# Patient Record
Sex: MALE | Employment: OTHER | URBAN - METROPOLITAN AREA
[De-identification: names, ages, dates, MRNs, and addresses within clinical notes are randomized per-mention and may not be internally consistent; named-entity substitution may affect disease eponyms.]

---

## 2024-04-15 NOTE — PROGRESS NOTES
Subjective     Estevan Staples is a 63 y.o. male with HG MIBC and T1c GGG2 PCa who presents as a new patient kindly referred by Dr. Varela for 2nd opinion.     The patient underwent TURBT and prostate biopsy 2/2024. Pathology was HG MIBC with iliac adenopathy and GGG2 T1c PCa in 1 core.    PSMA PET scan showed some lymph node activity on the R side.      He denies gross hematuria. He is able to walk a city block without issue.     PMHx:  has no past medical history on file.    PSHx:  Denies history of abdominal surgeries    Social: Tobacco Use: current smoker    Family: Cancer-related family history is not on file.              Review of Systems   All other systems reviewed and are negative.      Objective   Physical Exam  Gen: No acute distress     Psych: Alert and oriented x3     Neuro:  Normal ROM    Resp: Nonlabored respirations     CV: Regular rate and rhythm     Abd: S, NT, ND.   : Deferred    Skin: Warm, dry and intact without rashes     Lymphatics: No peripheral edema       Assessment/Plan   Problem List Items Addressed This Visit             ICD-10-CM    Bladder cancer (Multi) - Primary C67.9     I had a long talk today with the patient.  We spoke at length regarding the new diagnosis of muscle invasive bladder cancer and the treatment approaches.  He also has a new intermediate favorable prostate cancer which is less concerning.    CT urogram from months ago and a recent PET scan scan showed some lymph node activity on the R side. These lymph nodes are large and look necrotic on imaging.  They look very close to the vasculature.  I think these are related to his bladder cancer.  I do not think that a biopsy is completely necessary but not unreasonable.  Given that this lymph nodes are presumably bladder cancer, he is locally advanced.  I would not treat this with upfront surgery.  I think he needs chemotherapy first.  If his lymph nodes respond to chemotherapy then surgical resection for consolidation  can be considered.  I would love to see him back after chemotherapy to assess his scans and discuss.      We went over that treatment options in detail, specifically radical cystectomy, bilateral pelvic lymph node dissection and creation of urinary diversion with our without neoadjuvant chemotherapy.  We spoke about the benefit the 5-10% overall survival benefit when patients get chemotherapy prior to surgery when compared to patients who undergo surgery alone.  I normally recommend chemotherapy is cisplatin-eligible or clinical trial in cisplatin-ineligible patients.  I have set up a coordinated appointment with medical oncology for further information regarding neoadjuvant chemotherapy and possible consideration for clinical trials.      I have also given him information on cystectomy and BCAN. I will review his CT ordered after chemotherapy.            Relevant Orders    Referral to Malignant Hematology (Hematology / Oncology)       Thank you for the kind referral and allowing me to take part in the care of this patient.        Plan:  Medical oncology should treat with systemic therapy upfront.  I would recommend consolidative surgery after systemic therapy if demonstrable response.  I would like to see after systemic therapy scans are complete  Information given on cystectomy and bladder cancer advisory network for education  Will review his CT scan after chemo        Scribe Attestation  By signing my name below, I, Katelyn Casalla, Scribe   attest that this documentation has been prepared under the direction and in the presence of Timi Smith MD MPH.

## 2024-04-19 ENCOUNTER — OFFICE VISIT (OUTPATIENT)
Dept: UROLOGY | Facility: CLINIC | Age: 64
End: 2024-04-19
Payer: COMMERCIAL

## 2024-04-19 VITALS
BODY MASS INDEX: 25.11 KG/M2 | HEART RATE: 77 BPM | SYSTOLIC BLOOD PRESSURE: 154 MMHG | DIASTOLIC BLOOD PRESSURE: 85 MMHG | WEIGHT: 160 LBS | HEIGHT: 67 IN

## 2024-04-19 DIAGNOSIS — C67.9 MALIGNANT NEOPLASM OF URINARY BLADDER, UNSPECIFIED SITE (MULTI): Primary | ICD-10-CM

## 2024-04-19 PROCEDURE — 99205 OFFICE O/P NEW HI 60 MIN: CPT | Performed by: STUDENT IN AN ORGANIZED HEALTH CARE EDUCATION/TRAINING PROGRAM

## 2024-04-19 RX ORDER — LISINOPRIL 30 MG/1
TABLET ORAL
COMMUNITY
Start: 2024-03-01

## 2024-04-19 RX ORDER — ASPIRIN 81 MG/1
81 TABLET ORAL
COMMUNITY
Start: 2024-02-28

## 2024-04-19 NOTE — ASSESSMENT & PLAN NOTE
I had a long talk today with the patient.  We spoke at length regarding the new diagnosis of muscle invasive bladder cancer and the treatment approaches.  He also has a new intermediate favorable prostate cancer which is less concerning.    CT urogram from months ago and a recent PET scan scan showed some lymph node activity on the R side. These lymph nodes are large and look necrotic on imaging.  They look very close to the vasculature.  I think these are related to his bladder cancer.  I do not think that a biopsy is completely necessary but not unreasonable.  Given that this lymph nodes are presumably bladder cancer, he is locally advanced.  I would not treat this with upfront surgery.  I think he needs chemotherapy first.  If his lymph nodes respond to chemotherapy then surgical resection for consolidation can be considered.  I would love to see him back after chemotherapy to assess his scans and discuss.      We went over that treatment options in detail, specifically radical cystectomy, bilateral pelvic lymph node dissection and creation of urinary diversion with our without neoadjuvant chemotherapy.  We spoke about the benefit the 5-10% overall survival benefit when patients get chemotherapy prior to surgery when compared to patients who undergo surgery alone.  I normally recommend chemotherapy is cisplatin-eligible or clinical trial in cisplatin-ineligible patients.  I have set up a coordinated appointment with medical oncology for further information regarding neoadjuvant chemotherapy and possible consideration for clinical trials.      I have also given him information on cystectomy and BCAN. I will review his CT ordered after chemotherapy.

## 2024-04-19 NOTE — LETTER
April 19, 2024     Damaso Varela MD  [address] [facility]  [facility] [address]    Patient: Estevan Staples   YOB: 1960   Date of Visit: 4/19/2024       Dear Dr. Damaso Varela MD:    Thank you for referring Estevan Staples to me for evaluation. Below are my notes for this consultation.  If you have questions, please do not hesitate to call me. I look forward to following your patient along with you.       Sincerely,     Timi Smith MD MPH      CC: No Recipients  ______________________________________________________________________________________    Subjective     Estevan Staples is a 63 y.o. male with HG MIBC and T1c GGG2 PCa who presents as a new patient kindly referred by Dr. Varela for 2nd opinion.     The patient underwent TURBT and prostate biopsy 2/2024. Pathology was HG MIBC with iliac adenopathy and GGG2 T1c PCa in 1 core.    PSMA PET scan showed some lymph node activity on the R side.      He denies gross hematuria. He is able to walk a city block without issue.     PMHx:  has no past medical history on file.    PSHx:  Denies history of abdominal surgeries    Social: Tobacco Use: current smoker    Family: Cancer-related family history is not on file.              Review of Systems   All other systems reviewed and are negative.      Objective   Physical Exam  Gen: No acute distress     Psych: Alert and oriented x3     Neuro:  Normal ROM    Resp: Nonlabored respirations     CV: Regular rate and rhythm     Abd: S, NT, ND.   : Deferred    Skin: Warm, dry and intact without rashes     Lymphatics: No peripheral edema       Assessment/Plan   Problem List Items Addressed This Visit             ICD-10-CM    Bladder cancer (Multi) - Primary C67.9     I had a long talk today with the patient.  We spoke at length regarding the new diagnosis of muscle invasive bladder cancer and the treatment approaches.  He also has a new intermediate favorable prostate cancer which is  less concerning.    CT urogram from months ago and a recent PET scan scan showed some lymph node activity on the R side. These lymph nodes are large and look necrotic on imaging.  They look very close to the vasculature.  I think these are related to his bladder cancer.  I do not think that a biopsy is completely necessary but not unreasonable.  Given that this lymph nodes are presumably bladder cancer, he is locally advanced.  I would not treat this with upfront surgery.  I think he needs chemotherapy first.  If his lymph nodes respond to chemotherapy then surgical resection for consolidation can be considered.  I would love to see him back after chemotherapy to assess his scans and discuss.      We went over that treatment options in detail, specifically radical cystectomy, bilateral pelvic lymph node dissection and creation of urinary diversion with our without neoadjuvant chemotherapy.  We spoke about the benefit the 5-10% overall survival benefit when patients get chemotherapy prior to surgery when compared to patients who undergo surgery alone.  I normally recommend chemotherapy is cisplatin-eligible or clinical trial in cisplatin-ineligible patients.  I have set up a coordinated appointment with medical oncology for further information regarding neoadjuvant chemotherapy and possible consideration for clinical trials.      I have also given him information on cystectomy and BCAN. I will review his CT ordered after chemotherapy.            Relevant Orders    Referral to Malignant Hematology (Hematology / Oncology)       Thank you for the kind referral and allowing me to take part in the care of this patient.        Plan:  Medical oncology should treat with systemic therapy upfront.  I would recommend consolidative surgery after systemic therapy if demonstrable response.  I would like to see after systemic therapy scans are complete  Information given on cystectomy and bladder cancer advisory network for  education  Will review his CT scan after chemo        Scribe Attestation  By signing my name below, I, Katelyn Casalla, Zoe   attest that this documentation has been prepared under the direction and in the presence of Timi Smith MD MPH.

## 2024-04-19 NOTE — LETTER
April 19, 2024     Raúl Dukes MD  [address] [facility]  [facility] [address]    Patient: Estevan Staples   YOB: 1960   Date of Visit: 4/19/2024       Dear Dr. Raúl Dukes MD:    Thank you for referring Estevan Staples to me for evaluation. Below are my notes for this consultation.  If you have questions, please do not hesitate to call me. I look forward to following your patient along with you.       Sincerely,     Timi Smith MD MPH      CC: No Recipients  ______________________________________________________________________________________    Subjective     Estevan Staples is a 63 y.o. male with HG MIBC and T1c GGG2 PCa who presents as a new patient kindly referred by Dr. Varela for 2nd opinion.     The patient underwent TURBT and prostate biopsy 2/2024. Pathology was HG MIBC with iliac adenopathy and GGG2 T1c PCa in 1 core.    PSMA PET scan showed some lymph node activity on the R side.      He denies gross hematuria. He is able to walk a city block without issue.     PMHx:  has no past medical history on file.    PSHx:  Denies history of abdominal surgeries    Social: Tobacco Use: current smoker    Family: Cancer-related family history is not on file.              Review of Systems   All other systems reviewed and are negative.      Objective   Physical Exam  Gen: No acute distress     Psych: Alert and oriented x3     Neuro:  Normal ROM    Resp: Nonlabored respirations     CV: Regular rate and rhythm     Abd: S, NT, ND.   : Deferred    Skin: Warm, dry and intact without rashes     Lymphatics: No peripheral edema       Assessment/Plan   Problem List Items Addressed This Visit             ICD-10-CM    Bladder cancer (Multi) - Primary C67.9     I had a long talk today with the patient.  We spoke at length regarding the new diagnosis of muscle invasive bladder cancer and the treatment approaches.  He also has a new intermediate favorable prostate cancer which  is less concerning.    CT urogram from months ago and a recent PET scan scan showed some lymph node activity on the R side. These lymph nodes are large and look necrotic on imaging.  They look very close to the vasculature.  I think these are related to his bladder cancer.  I do not think that a biopsy is completely necessary but not unreasonable.  Given that this lymph nodes are presumably bladder cancer, he is locally advanced.  I would not treat this with upfront surgery.  I think he needs chemotherapy first.  If his lymph nodes respond to chemotherapy then surgical resection for consolidation can be considered.  I would love to see him back after chemotherapy to assess his scans and discuss.      We went over that treatment options in detail, specifically radical cystectomy, bilateral pelvic lymph node dissection and creation of urinary diversion with our without neoadjuvant chemotherapy.  We spoke about the benefit the 5-10% overall survival benefit when patients get chemotherapy prior to surgery when compared to patients who undergo surgery alone.  I normally recommend chemotherapy is cisplatin-eligible or clinical trial in cisplatin-ineligible patients.  I have set up a coordinated appointment with medical oncology for further information regarding neoadjuvant chemotherapy and possible consideration for clinical trials.      I have also given him information on cystectomy and BCAN. I will review his CT ordered after chemotherapy.            Relevant Orders    Referral to Malignant Hematology (Hematology / Oncology)       Thank you for the kind referral and allowing me to take part in the care of this patient.        Plan:  Medical oncology should treat with systemic therapy upfront.  I would recommend consolidative surgery after systemic therapy if demonstrable response.  I would like to see after systemic therapy scans are complete  Information given on cystectomy and bladder cancer advisory network for  education  Will review his CT scan after chemo        Scribe Attestation  By signing my name below, I, Katelyn Casalla, Zoe   attest that this documentation has been prepared under the direction and in the presence of Timi Smith MD MPH.